# Patient Record
Sex: MALE | Race: WHITE | NOT HISPANIC OR LATINO | Employment: OTHER | URBAN - METROPOLITAN AREA
[De-identification: names, ages, dates, MRNs, and addresses within clinical notes are randomized per-mention and may not be internally consistent; named-entity substitution may affect disease eponyms.]

---

## 2024-01-05 ENCOUNTER — OFFICE VISIT (OUTPATIENT)
Age: 74
End: 2024-01-05
Payer: MEDICARE

## 2024-01-05 VITALS
OXYGEN SATURATION: 95 % | DIASTOLIC BLOOD PRESSURE: 70 MMHG | BODY MASS INDEX: 28.09 KG/M2 | WEIGHT: 196.2 LBS | HEIGHT: 70 IN | HEART RATE: 83 BPM | SYSTOLIC BLOOD PRESSURE: 130 MMHG

## 2024-01-05 DIAGNOSIS — R93.1 AGATSTON CORONARY ARTERY CALCIUM SCORE BETWEEN 200 AND 399: ICD-10-CM

## 2024-01-05 DIAGNOSIS — I10 ESSENTIAL HYPERTENSION: Primary | ICD-10-CM

## 2024-01-05 DIAGNOSIS — E78.49 OTHER HYPERLIPIDEMIA: ICD-10-CM

## 2024-01-05 PROCEDURE — 3075F SYST BP GE 130 - 139MM HG: CPT | Performed by: INTERNAL MEDICINE

## 2024-01-05 PROCEDURE — 93000 ELECTROCARDIOGRAM COMPLETE: CPT | Performed by: INTERNAL MEDICINE

## 2024-01-05 PROCEDURE — 1160F RVW MEDS BY RX/DR IN RCRD: CPT | Performed by: INTERNAL MEDICINE

## 2024-01-05 PROCEDURE — 99204 OFFICE O/P NEW MOD 45 MIN: CPT | Performed by: INTERNAL MEDICINE

## 2024-01-05 PROCEDURE — 1159F MED LIST DOCD IN RCRD: CPT | Performed by: INTERNAL MEDICINE

## 2024-01-05 PROCEDURE — 3078F DIAST BP <80 MM HG: CPT | Performed by: INTERNAL MEDICINE

## 2024-01-05 RX ORDER — ZOLPIDEM TARTRATE 5 MG/1
1 TABLET ORAL NIGHTLY PRN
COMMUNITY

## 2024-01-05 RX ORDER — METHYLPREDNISOLONE 4 MG/1
TABLET ORAL
COMMUNITY
Start: 2024-01-02

## 2024-01-05 RX ORDER — NAPROXEN 500 MG/1
1 TABLET ORAL 2 TIMES DAILY WITH MEALS
COMMUNITY
Start: 2024-01-02

## 2024-01-05 RX ORDER — BENZONATATE 200 MG/1
CAPSULE ORAL
COMMUNITY
Start: 2024-01-02

## 2024-01-05 RX ORDER — LISINOPRIL 40 MG/1
1 TABLET ORAL DAILY
COMMUNITY

## 2024-01-05 RX ORDER — AZITHROMYCIN 250 MG/1
TABLET, FILM COATED ORAL
COMMUNITY
Start: 2024-01-02

## 2024-01-05 RX ORDER — ALBUTEROL SULFATE 90 UG/1
2 AEROSOL, METERED RESPIRATORY (INHALATION) EVERY 6 HOURS PRN
COMMUNITY

## 2024-01-05 RX ORDER — B-COMPLEX WITH VITAMIN C
TABLET ORAL
COMMUNITY

## 2024-01-05 RX ORDER — MULTIVIT-MIN/IRON/FOLIC ACID/K 18-600-40
CAPSULE ORAL
COMMUNITY

## 2024-01-05 RX ORDER — CYCLOBENZAPRINE HCL 10 MG
TABLET ORAL
COMMUNITY

## 2024-01-05 RX ORDER — TERBINAFINE HYDROCHLORIDE 250 MG/1
1 TABLET ORAL DAILY
COMMUNITY
Start: 2023-12-29

## 2024-01-05 RX ORDER — CELECOXIB 200 MG/1
1 CAPSULE ORAL DAILY
COMMUNITY

## 2024-01-05 RX ORDER — ATORVASTATIN CALCIUM 20 MG/1
1 TABLET, FILM COATED ORAL DAILY
COMMUNITY

## 2024-01-05 RX ORDER — SODIUM PHOSPHATE,MONO-DIBASIC 19G-7G/118
ENEMA (ML) RECTAL
COMMUNITY

## 2024-01-05 RX ORDER — ZOLMITRIPTAN 5 MG/1
1 TABLET, FILM COATED ORAL AS NEEDED
COMMUNITY

## 2024-01-05 RX ORDER — DOCUSATE SODIUM 100 MG/1
CAPSULE, LIQUID FILLED ORAL
COMMUNITY

## 2024-01-05 NOTE — LETTER
January 5, 2024       No Recipients    Patient: Brandon Sam   YOB: 1950   Date of Visit: 1/5/2024       Dear Feng Julio MD,    Brandon Sam was in my office today. Below are the relevant portions of my assessment and plan of care.           If you have questions, please do not hesitate to call me. I look forward to following Brandon along with you.         Sincerely,        Nancy White MD        CC:   No Recipients

## 2024-01-05 NOTE — PROGRESS NOTES
Subjective:     Encounter Date:01/05/2024      Patient ID: Brandon Sam is a 73 y.o. male.    Chief Complaint:  History of Present Illness    This is a 73-year-old with hypertension, hyperlipidemia, prior provoked DVT, who presents for cardiac evaluation.    The patient indicates that he has had several friends who have suffered cardiac issues recently.  As a result he opted to present today for cardiac evaluation and to establish care.  He denies any prior cardiac history.  He did undergo a calcium score CT scan in 2018 which showed a calcium score of 292 including a score of 162 involving the left main artery, 123 of the left anterior descending artery, one of the left circumflex artery, and 6 of the right coronary artery.  He was started on statin therapy with atorvastatin at that time and has remained on this since.  He has been on aspirin for several years since he suffered a DVT following ankle surgery.  He fortunately denies any family history of coronary artery disease.  He does have a family history of a sister requiring a pacemaker and parents with hypertension.    He reports that he underwent a repeat calcium score CT yesterday with results still pending.    He denies any chest pain, shortness of breath, palpitations, orthopnea, near-syncope or syncope or lower extremity swelling.  He exercises on a regular basis including walking 2 to 3 miles with his dog on a daily basis and swimming 2-3 times a week about half a mile.  He denies any changes in his exercise tolerance.    Review of Systems   Constitutional: Negative for malaise/fatigue.   HENT:  Negative for hearing loss, hoarse voice, nosebleeds and sore throat.    Eyes:  Negative for pain.   Cardiovascular:  Negative for chest pain, claudication, cyanosis, dyspnea on exertion, irregular heartbeat, leg swelling, near-syncope, orthopnea, palpitations, paroxysmal nocturnal dyspnea and syncope.   Respiratory:  Negative for shortness of breath and  snoring.    Endocrine: Negative for cold intolerance, heat intolerance, polydipsia, polyphagia and polyuria.   Skin:  Negative for itching and rash.   Musculoskeletal:  Negative for arthritis, falls, joint pain, joint swelling, muscle cramps, muscle weakness and myalgias.   Gastrointestinal:  Negative for constipation, diarrhea, dysphagia, heartburn, hematemesis, hematochezia, melena, nausea and vomiting.   Genitourinary:  Negative for frequency, hematuria and hesitancy.   Neurological:  Negative for excessive daytime sleepiness, dizziness, headaches, light-headedness, numbness and weakness.   Psychiatric/Behavioral:  Negative for depression. The patient is not nervous/anxious.          Current Outpatient Medications:     albuterol sulfate  (90 Base) MCG/ACT inhaler, Inhale 2 puffs Every 6 (Six) Hours As Needed., Disp: , Rfl:     Ascorbic Acid (Vitamin C) 500 MG capsule, Take  by mouth., Disp: , Rfl:     aspirin 81 MG oral suspension, , Disp: , Rfl:     atorvastatin (LIPITOR) 20 MG tablet, Take 1 tablet by mouth Daily., Disp: , Rfl:     azithromycin (ZITHROMAX) 250 MG tablet, TAKE 2 TABLETS (500 MG) BY ORAL ROUTE ONCE DAILY FOR 1 DAY THEN 1 TABLET (250 MG) BY ORAL ROUTE ONCE DAILY FOR 4 DAYS, Disp: , Rfl:     B Complex-C (Vitamin B + C Complex) tablet, , Disp: , Rfl:     benzonatate (TESSALON) 200 MG capsule, Take 1 capsule 3 times a day by oral route., Disp: , Rfl:     celecoxib (CeleBREX) 200 MG capsule, Take 1 capsule by mouth Daily. AS NEEDED ONLY, Disp: , Rfl:     cyclobenzaprine (FLEXERIL) 10 MG tablet, cyclobenzaprine 10 mg tablet  TAKE 1 TABLET BY MOUTH THREE TIMES DAILY AS NEEDED, Disp: , Rfl:     docusate sodium (Colace) 100 MG capsule, Take 1 capsule every day by oral route at bedtime., Disp: , Rfl:     glucosamine-chondroitin 500-400 MG capsule capsule, Take twice a day by oral route., Disp: , Rfl:     influenza vac split quad (FLUZONE,FLUARIX,AFLURIA,FLULAVAL) 0.5 ML suspension prefilled syringe  "injection, INJECT 0.5ML INTRAMUSCULARLY ONCE, Disp: , Rfl:     lisinopril (PRINIVIL,ZESTRIL) 40 MG tablet, Take 1 tablet by mouth Daily., Disp: , Rfl:     methylPREDNISolone (MEDROL) 4 MG dose pack, TAKE 6 TABLETS ON DAY 1 AS DIRECTED ON PACKAGE AND DECREASE BY 1 TAB EACH DAY FOR A TOTAL OF 6 DAYS, Disp: , Rfl:     Misc Natural Products (GLUCOSAMINE CHOND CMP ADVANCED PO), , Disp: , Rfl:     naproxen (NAPROSYN) 500 MG tablet, Take 1 tablet by mouth 2 (Two) Times a Day With Meals., Disp: , Rfl:     terbinafine (lamiSIL) 250 MG tablet, Take 1 tablet by mouth Daily., Disp: , Rfl:     vitamin D3 (Vitamin D) 125 MCG (5000 UT) capsule capsule, , Disp: , Rfl:     ZOLMitriptan (ZOMIG) 5 MG tablet, Take 1 tablet by mouth As Needed., Disp: , Rfl:     zolpidem (AMBIEN) 5 MG tablet, Take 1 tablet by mouth At Night As Needed., Disp: , Rfl:     History reviewed. No pertinent past medical history.    History reviewed. No pertinent surgical history.    Family History   Problem Relation Age of Onset    Hyperlipidemia Mother     Hypertension Mother     Hypertension Father     Heart disease Father        Social History     Tobacco Use    Smoking status: Never    Smokeless tobacco: Never   Vaping Use    Vaping Use: Never used   Substance Use Topics    Alcohol use: Yes     Alcohol/week: 1.0 standard drink of alcohol     Types: 1 Glasses of wine per week     Comment: 1-2 nightly    Drug use: Never         ECG 12 Lead    Date/Time: 1/5/2024 2:59 PM  Performed by: Nancy White MD    Authorized by: Nancy White MD  Comparison: compared with previous ECG   Comparison to previous ECG: PVC is new  Rhythm: sinus rhythm  Ectopy: unifocal PVCs             Objective:     Visit Vitals  /70 (BP Location: Left arm, Patient Position: Sitting, Cuff Size: Adult)   Pulse 83   Ht 177.8 cm (70\")   Wt 89 kg (196 lb 3.2 oz)   SpO2 95%   BMI 28.15 kg/m²         Constitutional:       Appearance: Normal appearance. Well-developed.   HENT:      " Head: Normocephalic and atraumatic.   Neck:      Vascular: No carotid bruit or JVD.   Pulmonary:      Effort: Pulmonary effort is normal.      Breath sounds: Normal breath sounds.   Cardiovascular:      Normal rate. Regular rhythm.      No gallop.    Pulses:     Radial: 2+ bilaterally.  Edema:     Peripheral edema absent.   Abdominal:      Palpations: Abdomen is soft.   Skin:     General: Skin is warm and dry.   Neurological:      Mental Status: Alert and oriented to person, place, and time.             Assessment:          Diagnosis Plan   1. Essential hypertension        2. Other hyperlipidemia        3. Agatston coronary artery calcium score between 200 and 399               Plan:       1.  Coronary artery calcification.  No symptoms suggestive of obstructive coronary artery disease.  EKG is unremarkable.  Already on medical management with aspirin and statin therapy.  Will follow-up on repeat coronary calcium CT results.  2.  Hypertension.  Well-controlled on current regimen medications.  Continue the same.  3.  Hyperlipidemia.  On atorvastatin which is managed by Dr. Boewrs.  Ideally with coronary artery calcification her goal LDL would be around 70 or below.  His most recent lipid panel showed his LDL was up at 98.  I think would be reasonable to follow-up on his repeat calcium CT results.  If there is a significant rise in his calcium score would consider more aggressive treatment of his lipids for goal LDL of 70 or below.  Will make further recommendations based on the calcium CT results.    I will call and review the results of his calcium score CT.  Otherwise we will tentatively plan on seeing the patient back again in 1 year or sooner if further issues arise.

## 2024-01-10 ENCOUNTER — TELEPHONE (OUTPATIENT)
Dept: CARDIOLOGY | Facility: CLINIC | Age: 74
End: 2024-01-10
Payer: MEDICARE

## 2024-01-10 NOTE — TELEPHONE ENCOUNTER
Received call from Cherie from Dr. Bowers office is wanting to start patient on Crestor 40 MG wants to know if you agree ?    Please advise Cherie can be reached at [phone].

## 2024-09-24 ENCOUNTER — OFFICE VISIT (OUTPATIENT)
Dept: ORTHOPEDIC SURGERY | Facility: CLINIC | Age: 74
End: 2024-09-24
Payer: MEDICARE

## 2024-09-24 VITALS — WEIGHT: 194.4 LBS | TEMPERATURE: 98 F | BODY MASS INDEX: 27.83 KG/M2 | HEIGHT: 70 IN

## 2024-09-24 DIAGNOSIS — R52 PAIN: Primary | ICD-10-CM

## 2024-09-24 DIAGNOSIS — M17.12 PRIMARY OSTEOARTHRITIS OF LEFT KNEE: ICD-10-CM

## 2024-09-24 PROCEDURE — 99204 OFFICE O/P NEW MOD 45 MIN: CPT | Performed by: ORTHOPAEDIC SURGERY

## 2024-09-24 PROCEDURE — 1160F RVW MEDS BY RX/DR IN RCRD: CPT | Performed by: ORTHOPAEDIC SURGERY

## 2024-09-24 PROCEDURE — 73560 X-RAY EXAM OF KNEE 1 OR 2: CPT | Performed by: ORTHOPAEDIC SURGERY

## 2024-09-24 PROCEDURE — 1159F MED LIST DOCD IN RCRD: CPT | Performed by: ORTHOPAEDIC SURGERY

## 2024-09-24 RX ORDER — DICLOFENAC SODIUM 75 MG/1
TABLET, DELAYED RELEASE ORAL
COMMUNITY
Start: 2024-09-02

## 2024-09-24 RX ORDER — ROSUVASTATIN CALCIUM 40 MG/1
TABLET, COATED ORAL
COMMUNITY
Start: 2024-09-23

## 2024-09-27 ENCOUNTER — PATIENT ROUNDING (BHMG ONLY) (OUTPATIENT)
Dept: ORTHOPEDIC SURGERY | Facility: CLINIC | Age: 74
End: 2024-09-27
Payer: MEDICARE